# Patient Record
Sex: FEMALE | Race: WHITE | NOT HISPANIC OR LATINO | Employment: PART TIME | URBAN - METROPOLITAN AREA
[De-identification: names, ages, dates, MRNs, and addresses within clinical notes are randomized per-mention and may not be internally consistent; named-entity substitution may affect disease eponyms.]

---

## 2024-02-01 DIAGNOSIS — N64.3 GALACTORRHEA NOT ASSOCIATED WITH CHILDBIRTH: ICD-10-CM

## 2024-02-01 DIAGNOSIS — N64.4 MASTODYNIA: Primary | ICD-10-CM

## 2024-02-01 DIAGNOSIS — N64.52 NIPPLE DISCHARGE: ICD-10-CM

## 2024-02-05 ENCOUNTER — LAB (OUTPATIENT)
Dept: LAB | Facility: LAB | Age: 29
End: 2024-02-05
Payer: COMMERCIAL

## 2024-02-05 DIAGNOSIS — N64.3 GALACTORRHEA NOT ASSOCIATED WITH CHILDBIRTH: ICD-10-CM

## 2024-02-05 DIAGNOSIS — N64.4 MASTODYNIA: ICD-10-CM

## 2024-02-05 DIAGNOSIS — N64.52 NIPPLE DISCHARGE: ICD-10-CM

## 2024-02-05 LAB
ERYTHROCYTE [DISTWIDTH] IN BLOOD BY AUTOMATED COUNT: 11.7 % (ref 11.5–14.5)
HCT VFR BLD AUTO: 42.5 % (ref 36–46)
HGB BLD-MCNC: 14.5 G/DL (ref 12–16)
MCH RBC QN AUTO: 30 PG (ref 26–34)
MCHC RBC AUTO-ENTMCNC: 34.1 G/DL (ref 32–36)
MCV RBC AUTO: 88 FL (ref 80–100)
NRBC BLD-RTO: 0 /100 WBCS (ref 0–0)
PLATELET # BLD AUTO: 244 X10*3/UL (ref 150–450)
PROLACTIN SERPL-MCNC: 8.5 UG/L (ref 3–20)
RBC # BLD AUTO: 4.84 X10*6/UL (ref 4–5.2)
TSH SERPL-ACNC: 1.47 MIU/L (ref 0.44–3.98)
WBC # BLD AUTO: 4.4 X10*3/UL (ref 4.4–11.3)

## 2024-02-05 PROCEDURE — 84443 ASSAY THYROID STIM HORMONE: CPT

## 2024-02-05 PROCEDURE — 36415 COLL VENOUS BLD VENIPUNCTURE: CPT

## 2024-02-05 PROCEDURE — 84146 ASSAY OF PROLACTIN: CPT

## 2024-02-05 PROCEDURE — 85027 COMPLETE CBC AUTOMATED: CPT

## 2024-03-05 ENCOUNTER — HOSPITAL ENCOUNTER (OUTPATIENT)
Dept: RADIOLOGY | Facility: HOSPITAL | Age: 29
Discharge: HOME | End: 2024-03-05
Payer: COMMERCIAL

## 2024-03-05 DIAGNOSIS — Z12.39 ENCOUNTER FOR OTHER SCREENING FOR MALIGNANT NEOPLASM OF BREAST: ICD-10-CM

## 2024-03-05 DIAGNOSIS — R92.8 ABNORMAL MAMMOGRAM OF BOTH BREASTS: ICD-10-CM

## 2024-03-05 DIAGNOSIS — N64.4 MASTODYNIA: ICD-10-CM

## 2024-03-05 DIAGNOSIS — N63.11 UNSPECIFIED LUMP IN THE RIGHT BREAST, UPPER OUTER QUADRANT: ICD-10-CM

## 2024-03-05 DIAGNOSIS — R92.8 ABNORMAL MAMMOGRAM OF BOTH BREASTS: Primary | ICD-10-CM

## 2024-03-05 DIAGNOSIS — Z80.3 FAMILY HISTORY OF MALIGNANT NEOPLASM OF BREAST: ICD-10-CM

## 2024-03-05 PROCEDURE — 77062 BREAST TOMOSYNTHESIS BI: CPT

## 2024-03-05 PROCEDURE — 76642 ULTRASOUND BREAST LIMITED: CPT | Mod: RT

## 2024-03-05 PROCEDURE — 77062 BREAST TOMOSYNTHESIS BI: CPT | Performed by: RADIOLOGY

## 2024-03-05 PROCEDURE — 76981 USE PARENCHYMA: CPT

## 2024-03-05 PROCEDURE — 76642 ULTRASOUND BREAST LIMITED: CPT | Performed by: RADIOLOGY

## 2024-03-05 PROCEDURE — 77066 DX MAMMO INCL CAD BI: CPT | Performed by: RADIOLOGY

## 2024-04-22 PROBLEM — J03.90 ACUTE TONSILLITIS: Status: RESOLVED | Noted: 2024-04-22 | Resolved: 2024-04-22

## 2024-04-22 PROBLEM — T14.90XA BLUNT INJURY: Status: RESOLVED | Noted: 2024-04-22 | Resolved: 2024-04-22

## 2024-04-22 PROBLEM — R51.9 HEADACHE: Status: ACTIVE | Noted: 2024-04-22

## 2024-04-22 PROBLEM — J02.9 SORE THROAT: Status: RESOLVED | Noted: 2024-04-22 | Resolved: 2024-04-22

## 2024-04-22 PROBLEM — H53.9 ABNORMAL VISION: Status: ACTIVE | Noted: 2024-04-22

## 2024-04-22 PROBLEM — R10.9 ABDOMINAL PAIN IN PREGNANCY (HHS-HCC): Status: ACTIVE | Noted: 2024-04-22

## 2024-04-22 PROBLEM — S61.219A LACERATION OF FINGER: Status: RESOLVED | Noted: 2024-04-22 | Resolved: 2024-04-22

## 2024-04-22 PROBLEM — O21.9 VOMITING OF PREGNANCY (HHS-HCC): Status: RESOLVED | Noted: 2024-04-22 | Resolved: 2024-04-22

## 2024-04-22 PROBLEM — E86.0 DEHYDRATION: Status: RESOLVED | Noted: 2024-04-22 | Resolved: 2024-04-22

## 2024-04-22 PROBLEM — O26.899 ABDOMINAL PAIN IN PREGNANCY (HHS-HCC): Status: ACTIVE | Noted: 2024-04-22

## 2024-04-22 PROBLEM — J02.9 ACUTE PHARYNGITIS: Status: RESOLVED | Noted: 2024-04-22 | Resolved: 2024-04-22
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
## 2024-04-23 ENCOUNTER — LAB (OUTPATIENT)
Dept: LAB | Facility: LAB | Age: 29
End: 2024-04-23
Payer: COMMERCIAL

## 2024-04-23 ENCOUNTER — OFFICE VISIT (OUTPATIENT)
Dept: PRIMARY CARE | Facility: CLINIC | Age: 29
End: 2024-04-23
Payer: COMMERCIAL

## 2024-04-23 VITALS
DIASTOLIC BLOOD PRESSURE: 70 MMHG | OXYGEN SATURATION: 99 % | BODY MASS INDEX: 21.51 KG/M2 | HEART RATE: 64 BPM | HEIGHT: 63 IN | SYSTOLIC BLOOD PRESSURE: 106 MMHG | WEIGHT: 121.4 LBS

## 2024-04-23 DIAGNOSIS — R51.9 NONINTRACTABLE HEADACHE, UNSPECIFIED CHRONICITY PATTERN, UNSPECIFIED HEADACHE TYPE: ICD-10-CM

## 2024-04-23 DIAGNOSIS — R42 POSITIONAL LIGHTHEADEDNESS: ICD-10-CM

## 2024-04-23 DIAGNOSIS — M25.50 HYPERMOBILITY ARTHRALGIA: ICD-10-CM

## 2024-04-23 DIAGNOSIS — E55.9 VITAMIN D DEFICIENCY: ICD-10-CM

## 2024-04-23 DIAGNOSIS — Z00.00 HEALTHCARE MAINTENANCE: ICD-10-CM

## 2024-04-23 DIAGNOSIS — F41.8 ANXIETY WITH DEPRESSION: Primary | ICD-10-CM

## 2024-04-23 LAB
ALBUMIN SERPL BCP-MCNC: 4.7 G/DL (ref 3.4–5)
ALP SERPL-CCNC: 44 U/L (ref 33–110)
ALT SERPL W P-5'-P-CCNC: 14 U/L (ref 7–45)
ANION GAP SERPL CALC-SCNC: 13 MMOL/L (ref 10–20)
AST SERPL W P-5'-P-CCNC: 18 U/L (ref 9–39)
BILIRUB SERPL-MCNC: 0.8 MG/DL (ref 0–1.2)
BUN SERPL-MCNC: 9 MG/DL (ref 6–23)
CALCIUM SERPL-MCNC: 9.4 MG/DL (ref 8.6–10.3)
CHLORIDE SERPL-SCNC: 103 MMOL/L (ref 98–107)
CO2 SERPL-SCNC: 27 MMOL/L (ref 21–32)
CREAT SERPL-MCNC: 1.06 MG/DL (ref 0.5–1.05)
CRP SERPL-MCNC: <0.1 MG/DL
EGFRCR SERPLBLD CKD-EPI 2021: 74 ML/MIN/1.73M*2
ERYTHROCYTE [DISTWIDTH] IN BLOOD BY AUTOMATED COUNT: 12.7 % (ref 11.5–14.5)
ERYTHROCYTE [SEDIMENTATION RATE] IN BLOOD BY WESTERGREN METHOD: 10 MM/H (ref 0–20)
GLUCOSE SERPL-MCNC: 79 MG/DL (ref 74–99)
HCT VFR BLD AUTO: 41.3 % (ref 36–46)
HGB BLD-MCNC: 13.7 G/DL (ref 12–16)
MCH RBC QN AUTO: 30 PG (ref 26–34)
MCHC RBC AUTO-ENTMCNC: 33.2 G/DL (ref 32–36)
MCV RBC AUTO: 90 FL (ref 80–100)
NRBC BLD-RTO: 0 /100 WBCS (ref 0–0)
PLATELET # BLD AUTO: 229 X10*3/UL (ref 150–450)
POTASSIUM SERPL-SCNC: 3.7 MMOL/L (ref 3.5–5.3)
PROT SERPL-MCNC: 7.4 G/DL (ref 6.4–8.2)
RBC # BLD AUTO: 4.57 X10*6/UL (ref 4–5.2)
SODIUM SERPL-SCNC: 139 MMOL/L (ref 136–145)
WBC # BLD AUTO: 4.2 X10*3/UL (ref 4.4–11.3)

## 2024-04-23 PROCEDURE — 1036F TOBACCO NON-USER: CPT | Performed by: NURSE PRACTITIONER

## 2024-04-23 PROCEDURE — 82306 VITAMIN D 25 HYDROXY: CPT

## 2024-04-23 PROCEDURE — 36415 COLL VENOUS BLD VENIPUNCTURE: CPT

## 2024-04-23 PROCEDURE — 85027 COMPLETE CBC AUTOMATED: CPT

## 2024-04-23 PROCEDURE — 99204 OFFICE O/P NEW MOD 45 MIN: CPT | Performed by: NURSE PRACTITIONER

## 2024-04-23 PROCEDURE — 80053 COMPREHEN METABOLIC PANEL: CPT

## 2024-04-23 PROCEDURE — 85652 RBC SED RATE AUTOMATED: CPT

## 2024-04-23 PROCEDURE — 86140 C-REACTIVE PROTEIN: CPT

## 2024-04-23 PROCEDURE — 86038 ANTINUCLEAR ANTIBODIES: CPT

## 2024-04-23 RX ORDER — BISMUTH SUBSALICYLATE 262 MG
1 TABLET,CHEWABLE ORAL DAILY
COMMUNITY

## 2024-04-23 ASSESSMENT — ENCOUNTER SYMPTOMS
SLEEP DISTURBANCE: 1
TROUBLE SWALLOWING: 0
PALPITATIONS: 0
WHEEZING: 0
WOUND: 0
CHILLS: 0
DYSPHORIC MOOD: 1
BRUISES/BLEEDS EASILY: 0
SEIZURES: 0
HEADACHES: 1
MYALGIAS: 1
NERVOUS/ANXIOUS: 1
EYE PAIN: 0
NAUSEA: 0
DIZZINESS: 0
COLOR CHANGE: 0
FATIGUE: 0
COUGH: 0
ABDOMINAL PAIN: 0
ABDOMINAL DISTENTION: 0
ARTHRALGIAS: 1
WEAKNESS: 0
DIARRHEA: 0
SHORTNESS OF BREATH: 0
JOINT SWELLING: 0
ADENOPATHY: 0
FEVER: 0
DIFFICULTY URINATING: 0
CONSTIPATION: 0

## 2024-04-23 ASSESSMENT — PATIENT HEALTH QUESTIONNAIRE - PHQ9
6. FEELING BAD ABOUT YOURSELF - OR THAT YOU ARE A FAILURE OR HAVE LET YOURSELF OR YOUR FAMILY DOWN: NEARLY EVERY DAY
1. LITTLE INTEREST OR PLEASURE IN DOING THINGS: NEARLY EVERY DAY
1. LITTLE INTEREST OR PLEASURE IN DOING THINGS: NEARLY EVERY DAY
5. POOR APPETITE OR OVEREATING: MORE THAN HALF THE DAYS
10. IF YOU CHECKED OFF ANY PROBLEMS, HOW DIFFICULT HAVE THESE PROBLEMS MADE IT FOR YOU TO DO YOUR WORK, TAKE CARE OF THINGS AT HOME, OR GET ALONG WITH OTHER PEOPLE: VERY DIFFICULT
SUM OF ALL RESPONSES TO PHQ QUESTIONS 1-9: 18
9. THOUGHTS THAT YOU WOULD BE BETTER OFF DEAD, OR OF HURTING YOURSELF: NOT AT ALL
3. TROUBLE FALLING OR STAYING ASLEEP OR SLEEPING TOO MUCH: NEARLY EVERY DAY
2. FEELING DOWN, DEPRESSED OR HOPELESS: MORE THAN HALF THE DAYS
8. MOVING OR SPEAKING SO SLOWLY THAT OTHER PEOPLE COULD HAVE NOTICED. OR THE OPPOSITE, BEING SO FIGETY OR RESTLESS THAT YOU HAVE BEEN MOVING AROUND A LOT MORE THAN USUAL: MORE THAN HALF THE DAYS
10. IF YOU CHECKED OFF ANY PROBLEMS, HOW DIFFICULT HAVE THESE PROBLEMS MADE IT FOR YOU TO DO YOUR WORK, TAKE CARE OF THINGS AT HOME, OR GET ALONG WITH OTHER PEOPLE: VERY DIFFICULT
3. TROUBLE FALLING OR STAYING ASLEEP OR SLEEPING TOO MUCH: NEARLY EVERY DAY
9. THOUGHTS THAT YOU WOULD BE BETTER OFF DEAD, OR OF HURTING YOURSELF: NOT AT ALL
2. FEELING DOWN, DEPRESSED OR HOPELESS: MORE THAN HALF THE DAYS
7. TROUBLE CONCENTRATING ON THINGS, SUCH AS READING THE NEWSPAPER OR WATCHING TELEVISION: SEVERAL DAYS
SUM OF ALL RESPONSES TO PHQ9 QUESTIONS 1 AND 2: 5
4. FEELING TIRED OR HAVING LITTLE ENERGY: NEARLY EVERY DAY
7. TROUBLE CONCENTRATING ON THINGS, SUCH AS READING THE NEWSPAPER OR WATCHING TELEVISION: NOT AT ALL
6. FEELING BAD ABOUT YOURSELF - OR THAT YOU ARE A FAILURE OR HAVE LET YOURSELF OR YOUR FAMILY DOWN: MORE THAN HALF THE DAYS
8. MOVING OR SPEAKING SO SLOWLY THAT OTHER PEOPLE COULD HAVE NOTICED. OR THE OPPOSITE, BEING SO FIGETY OR RESTLESS THAT YOU HAVE BEEN MOVING AROUND A LOT MORE THAN USUAL: NEARLY EVERY DAY
SUM OF ALL RESPONSES TO PHQ QUESTIONS 1-9: 19
SUM OF ALL RESPONSES TO PHQ9 QUESTIONS 1 AND 2: 5
1. LITTLE INTEREST OR PLEASURE IN DOING THINGS: MORE THAN HALF THE DAYS
2. FEELING DOWN, DEPRESSED OR HOPELESS: MORE THAN HALF THE DAYS
5. POOR APPETITE OR OVEREATING: NEARLY EVERY DAY
4. FEELING TIRED OR HAVING LITTLE ENERGY: NEARLY EVERY DAY
SUM OF ALL RESPONSES TO PHQ9 QUESTIONS 1 AND 2: 4

## 2024-04-23 ASSESSMENT — ANXIETY QUESTIONNAIRES
2. NOT BEING ABLE TO STOP OR CONTROL WORRYING: NEARLY EVERY DAY
GAD7 TOTAL SCORE: 15
4. TROUBLE RELAXING: MORE THAN HALF THE DAYS
3. WORRYING TOO MUCH ABOUT DIFFERENT THINGS: NEARLY EVERY DAY
IF YOU CHECKED OFF ANY PROBLEMS ON THIS QUESTIONNAIRE, HOW DIFFICULT HAVE THESE PROBLEMS MADE IT FOR YOU TO DO YOUR WORK, TAKE CARE OF THINGS AT HOME, OR GET ALONG WITH OTHER PEOPLE: VERY DIFFICULT
7. FEELING AFRAID AS IF SOMETHING AWFUL MIGHT HAPPEN: SEVERAL DAYS
6. BECOMING EASILY ANNOYED OR IRRITABLE: MORE THAN HALF THE DAYS
1. FEELING NERVOUS, ANXIOUS, OR ON EDGE: MORE THAN HALF THE DAYS
5. BEING SO RESTLESS THAT IT IS HARD TO SIT STILL: MORE THAN HALF THE DAYS

## 2024-04-23 ASSESSMENT — COLUMBIA-SUICIDE SEVERITY RATING SCALE - C-SSRS
6. HAVE YOU EVER DONE ANYTHING, STARTED TO DO ANYTHING, OR PREPARED TO DO ANYTHING TO END YOUR LIFE?: NO
2. HAVE YOU ACTUALLY HAD ANY THOUGHTS OF KILLING YOURSELF?: NO
1. IN THE PAST MONTH, HAVE YOU WISHED YOU WERE DEAD OR WISHED YOU COULD GO TO SLEEP AND NOT WAKE UP?: NO

## 2024-04-23 NOTE — ASSESSMENT & PLAN NOTE
Anxiety and depression screening completed today.   JEFFERSON-7: 15 severe anxiety  PHQ 2/9: 19 moderately severe depression  Patient is declining any pharmacological interventions at this time but is agreeable to counseling services. Referral placed for [facility]

## 2024-04-23 NOTE — PROGRESS NOTES
"Subjective   Patient ID: Kathryn Suarez is a 28 y.o. female who presents for Establish Care and Fatigue (FULL BODY PAIN / FATIGUE / DIZZY SPELLS (HAVE HAD FOR A WHILE WORSENING)).    Patient seen today in order to establish primary care. Patient became tearful at the beginning of today's visit citing multiple stressors in her life. She states that she feels \"stuck\" and not in a position to change anything. Patient denies any thoughts of self-harm or suicidal ideation. She has taken anti-depressants as a teenager but states that they made her feel \"numb\". Patient is not currently interested in any pharmacological interventions but is open to counseling services. 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 She states that she works long hours at 2 different jobs and has issues with insomnia. Patient denies alcohol use but admits to vaping tobacco and THC daily.  Patient states that she will go almost all day without eating and then \"eat everything in sight\" at night. She states that she does not drink any water but mainly soda. Patient reports eating mainly junk food. She states that she intentionally lost over 100 pounds about 8 months ago and her weight has been stable ever since. Lightheaded, dizziness and SOB reported with position changes. Patient denies associated chest pain or pressure. Headaches and blurred vision are intermittently reported; no recent follow-up with ophthalmology. Recent mammogram completed earlier this year due to calcification concerns. Patient has a strong family history of breast cancer and radiology recommended annual mammograms for screening purposes. Patient follows with a gynecologist \"as needed\"; last PAP appears to have been completed in 2021. Patient complains of intermittent joint/generalized pain which she feels like is related to hypermobility. She states her joints \"snap, crackle and pop\". Patient states that yoga " "helps with her symptoms but she is not able to do this consistently. Very occasional episodes of feeling like \"a bolt of lightening\" in the back of her neck/ brain reported; this lasts only a few seconds and resolves without intervention.         Review of Systems   Constitutional:  Negative for chills, fatigue and fever.   HENT:  Negative for dental problem and trouble swallowing.    Eyes:  Negative for pain and visual disturbance.        Positive for occasional blurry vision   Respiratory:  Negative for cough, shortness of breath and wheezing.    Cardiovascular:  Negative for chest pain, palpitations and leg swelling.   Gastrointestinal:  Negative for abdominal distention, abdominal pain, constipation, diarrhea and nausea.   Endocrine: Negative for cold intolerance and heat intolerance.   Genitourinary:  Negative for difficulty urinating.   Musculoskeletal:  Positive for arthralgias and myalgias. Negative for gait problem and joint swelling.        See HPI   Skin:  Negative for color change, pallor, rash and wound.   Allergic/Immunologic: Negative for environmental allergies and food allergies.   Neurological:  Positive for headaches. Negative for dizziness, seizures and weakness.        See HPI   Hematological:  Negative for adenopathy. Does not bruise/bleed easily.   Psychiatric/Behavioral:  Positive for dysphoric mood and sleep disturbance. The patient is nervous/anxious.         Positive for anxiety and depression. See HPI   All other systems reviewed and are negative.      Objective   /70   Pulse 64   Ht 1.593 m (5' 2.7\")   Wt 55.1 kg (121 lb 6.4 oz)   SpO2 99%   BMI 21.71 kg/m²     Physical Exam  Constitutional:       General: She is not in acute distress.     Appearance: Normal appearance. She is not toxic-appearing.   HENT:      Head: Normocephalic and atraumatic.      Right Ear: Tympanic membrane, ear canal and external ear normal.      Left Ear: Tympanic membrane, ear canal and external ear " normal.      Nose: Nose normal.      Mouth/Throat:      Mouth: Mucous membranes are moist.      Pharynx: Oropharynx is clear.   Eyes:      Extraocular Movements: Extraocular movements intact.      Conjunctiva/sclera: Conjunctivae normal.      Pupils: Pupils are equal, round, and reactive to light.   Cardiovascular:      Rate and Rhythm: Normal rate and regular rhythm.      Pulses: Normal pulses.      Heart sounds: Normal heart sounds. No murmur heard.  Pulmonary:      Effort: Pulmonary effort is normal.      Breath sounds: Normal breath sounds. No wheezing.   Abdominal:      General: Abdomen is flat. Bowel sounds are normal.      Palpations: Abdomen is soft.   Musculoskeletal:         General: No swelling. Normal range of motion.      Cervical back: Normal range of motion and neck supple.   Skin:     General: Skin is warm and dry.   Neurological:      General: No focal deficit present.      Mental Status: She is alert. Mental status is at baseline.      Cranial Nerves: No cranial nerve deficit.      Motor: No weakness.   Psychiatric:         Mood and Affect: Mood normal.         Thought Content: Thought content normal.         Judgment: Judgment normal.      Comments: Tearful during assessment         Assessment/Plan   Problem List Items Addressed This Visit             ICD-10-CM    Headache R51.9     Encouraged ophthalmology follow-up for evaluation purposes         Anxiety with depression - Primary F41.8     Anxiety and depression screening completed today.   JEFFERSON-7: 15 severe anxiety  PHQ 2/9: 19 moderately severe depression  Patient is declining any pharmacological interventions at this time but is agreeable to counseling services. 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 Inflammatory markers ordered with routine blood work         Relevant Orders    C-reactive protein    Sedimentation Rate    YAHAIRA with Reflex to BEVERLY    Vitamin D deficiency E55.9    Relevant Orders    Vitamin D 25-Hydroxy,Total (for eval of Vitamin D levels)    Positional lightheadedness R42     Possible POTS? Encouraged increased water intake and compression stockings. Consider cardiology referral if persistent or worsening S/S

## 2024-04-23 NOTE — ASSESSMENT & PLAN NOTE
Possible POTS? Encouraged increased water intake and compression stockings. Consider cardiology referral if persistent or worsening S/S

## 2024-04-24 LAB
25(OH)D3 SERPL-MCNC: 23 NG/ML (ref 30–100)
ANA SER QL HEP2 SUBST: NEGATIVE

## 2024-04-25 ENCOUNTER — TELEPHONE (OUTPATIENT)
Dept: PRIMARY CARE | Facility: CLINIC | Age: 29
End: 2024-04-25
Payer: COMMERCIAL

## 2024-04-25 RX ORDER — CHOLECALCIFEROL (VITAMIN D3) 25 MCG
1000 TABLET ORAL DAILY
Qty: 90 TABLET | Refills: 3 | Status: SHIPPED | OUTPATIENT
Start: 2024-04-25 | End: 2025-04-25

## 2024-04-25 NOTE — TELEPHONE ENCOUNTER
----- Message from SASHA Orourke-CNP sent at 4/25/2024  8:53 AM EDT -----  Most recent labs reviewed and found to be relatively stable. A daily vitamin D supplement has been sent to preferred pharmacy due to a new vitamin D deficiency levels will continue to be monitored

## 2024-04-25 NOTE — TELEPHONE ENCOUNTER
Result Communication    Resulted Orders   CBC   Result Value Ref Range    WBC 4.2 (L) 4.4 - 11.3 x10*3/uL    nRBC 0.0 0.0 - 0.0 /100 WBCs    RBC 4.57 4.00 - 5.20 x10*6/uL    Hemoglobin 13.7 12.0 - 16.0 g/dL    Hematocrit 41.3 36.0 - 46.0 %    MCV 90 80 - 100 fL    MCH 30.0 26.0 - 34.0 pg    MCHC 33.2 32.0 - 36.0 g/dL    RDW 12.7 11.5 - 14.5 %    Platelets 229 150 - 450 x10*3/uL   Comprehensive Metabolic Panel   Result Value Ref Range    Glucose 79 74 - 99 mg/dL    Sodium 139 136 - 145 mmol/L    Potassium 3.7 3.5 - 5.3 mmol/L    Chloride 103 98 - 107 mmol/L    Bicarbonate 27 21 - 32 mmol/L    Anion Gap 13 10 - 20 mmol/L    Urea Nitrogen 9 6 - 23 mg/dL    Creatinine 1.06 (H) 0.50 - 1.05 mg/dL    eGFR 74 >60 mL/min/1.73m*2      Comment:      Calculations of estimated GFR are performed using the 2021 CKD-EPI Study Refit equation without the race variable for the IDMS-Traceable creatinine methods.  https://jasn.asnjournals.org/content/early/2021/09/22/ASN.8593223343    Calcium 9.4 8.6 - 10.3 mg/dL    Albumin 4.7 3.4 - 5.0 g/dL    Alkaline Phosphatase 44 33 - 110 U/L    Total Protein 7.4 6.4 - 8.2 g/dL    AST 18 9 - 39 U/L    Bilirubin, Total 0.8 0.0 - 1.2 mg/dL    ALT 14 7 - 45 U/L      Comment:      Patients treated with Sulfasalazine may generate falsely decreased results for ALT.   Vitamin D 25-Hydroxy,Total (for eval of Vitamin D levels)   Result Value Ref Range    Vitamin D, 25-Hydroxy, Total 23 (L) 30 - 100 ng/mL    Narrative    Deficiency:         < 20   ng/ml  Insufficiency:      20-29  ng/ml  Sufficiency:         ng/ml  This assay accurately quantifies the sum of Vitamin D3, 25-Hydroxy and Vitamin D2,25-Hydroxy.   C-reactive protein   Result Value Ref Range    C-Reactive Protein <0.10 <1.00 mg/dL   Sedimentation Rate   Result Value Ref Range    Sedimentation Rate 10 0 - 20 mm/h   YAHAIRA with Reflex to BEVERLY   Result Value Ref Range    YAHAIRA Negative Negative      Comment:      The Antinuclear Antibody (YAHAIRA) test  was performed using  indirect immunofluorescence assay with HEp-2 cells slide.       1:11 PM      Results were successfully communicated with the patient and they acknowledged their understanding.

## 2024-08-21 PROCEDURE — 88175 CYTOPATH C/V AUTO FLUID REDO: CPT

## 2024-08-23 ENCOUNTER — LAB REQUISITION (OUTPATIENT)
Dept: LAB | Facility: HOSPITAL | Age: 29
End: 2024-08-23
Payer: COMMERCIAL

## 2024-08-23 DIAGNOSIS — Z01.419 ENCOUNTER FOR GYNECOLOGICAL EXAMINATION (GENERAL) (ROUTINE) WITHOUT ABNORMAL FINDINGS: ICD-10-CM

## 2024-08-23 DIAGNOSIS — Z11.51 ENCOUNTER FOR SCREENING FOR HUMAN PAPILLOMAVIRUS (HPV): ICD-10-CM

## 2024-08-30 LAB
CYTOLOGY CMNT CVX/VAG CYTO-IMP: NORMAL
LAB AP HPV GENOTYPE QUESTION: YES
LAB AP HPV HR: NORMAL
LABORATORY COMMENT REPORT: NORMAL
LMP START DATE: NORMAL
PATH REPORT.TOTAL CANCER: NORMAL